# Patient Record
Sex: MALE | Race: WHITE | NOT HISPANIC OR LATINO | Employment: FULL TIME | URBAN - NONMETROPOLITAN AREA
[De-identification: names, ages, dates, MRNs, and addresses within clinical notes are randomized per-mention and may not be internally consistent; named-entity substitution may affect disease eponyms.]

---

## 2017-09-12 ENCOUNTER — OFFICE VISIT (OUTPATIENT)
Dept: UROLOGY | Facility: CLINIC | Age: 67
End: 2017-09-12

## 2017-09-12 VITALS — WEIGHT: 166 LBS | TEMPERATURE: 97.2 F | HEIGHT: 66 IN | BODY MASS INDEX: 26.68 KG/M2

## 2017-09-12 DIAGNOSIS — N40.1 BENIGN NON-NODULAR PROSTATIC HYPERPLASIA WITH LOWER URINARY TRACT SYMPTOMS: ICD-10-CM

## 2017-09-12 LAB
BILIRUB BLD-MCNC: NEGATIVE MG/DL
CLARITY, POC: CLEAR
COLOR UR: YELLOW
GLUCOSE UR STRIP-MCNC: NEGATIVE MG/DL
KETONES UR QL: NEGATIVE
LEUKOCYTE EST, POC: NEGATIVE
NITRITE UR-MCNC: NEGATIVE MG/ML
PH UR: 6 [PH] (ref 5–8)
PROT UR STRIP-MCNC: NEGATIVE MG/DL
PSA SERPL-MCNC: 1.53 NG/ML (ref 0–4)
RBC # UR STRIP: NEGATIVE /UL
SP GR UR: 1.01 (ref 1–1.03)
UROBILINOGEN UR QL: NORMAL

## 2017-09-12 PROCEDURE — 99204 OFFICE O/P NEW MOD 45 MIN: CPT | Performed by: UROLOGY

## 2017-09-12 PROCEDURE — 81003 URINALYSIS AUTO W/O SCOPE: CPT | Performed by: UROLOGY

## 2017-09-12 PROCEDURE — 51798 US URINE CAPACITY MEASURE: CPT | Performed by: UROLOGY

## 2017-09-12 RX ORDER — PANTOPRAZOLE SODIUM 40 MG/1
TABLET, DELAYED RELEASE ORAL
Refills: 5 | COMMUNITY
Start: 2017-09-06

## 2017-09-12 RX ORDER — TAMSULOSIN HYDROCHLORIDE 0.4 MG/1
1 CAPSULE ORAL DAILY
Qty: 30 CAPSULE | Refills: 11 | Status: SHIPPED | OUTPATIENT
Start: 2017-09-12 | End: 2018-08-21 | Stop reason: SDUPTHER

## 2017-09-12 NOTE — PROGRESS NOTES
Mr. Rosario is 66 y.o. male    Chief Complaint   Patient presents with   • Benign Prostatic Hypertrophy       History of Present Illness    The following portions of the patient's history were reviewed and updated as appropriate: allergies, current medications, past family history, past medical history, past social history, past surgical history and problem list.    Review of Systems   Constitutional: Negative for appetite change and fever.   HENT: Negative for hearing loss and sore throat.    Eyes: Negative for pain and redness.   Respiratory: Negative for cough and shortness of breath.    Cardiovascular: Negative for chest pain and leg swelling.   Gastrointestinal: Negative for anal bleeding, nausea and vomiting.   Endocrine: Negative for cold intolerance and heat intolerance.   Genitourinary: Positive for frequency. Negative for dysuria, flank pain, hematuria and urgency.   Musculoskeletal: Negative for joint swelling and myalgias.   Skin: Negative for color change and rash.   Allergic/Immunologic: Negative for immunocompromised state.   Neurological: Negative for dizziness and speech difficulty.   Hematological: Negative for adenopathy. Does not bruise/bleed easily.   Psychiatric/Behavioral: Negative for dysphoric mood and suicidal ideas.         Current Outpatient Prescriptions:   •  pantoprazole (PROTONIX) 40 MG EC tablet, TAKE 1 TABLET BY MOUTH EVERY DAY, Disp: , Rfl: 5  •  tamsulosin (FLOMAX) 0.4 MG capsule 24 hr capsule, Take 1 capsule by mouth Daily., Disp: 30 capsule, Rfl: 11    History reviewed. No pertinent past medical history.    Past Surgical History:   Procedure Laterality Date   • HERNIA REPAIR     • TONSILLECTOMY         Social History     Social History   • Marital status: Unknown     Spouse name: N/A   • Number of children: N/A   • Years of education: N/A     Social History Main Topics   • Smoking status: Never Smoker   • Smokeless tobacco: None   • Alcohol use None   • Drug use: None   •  "Sexual activity: Not Asked     Other Topics Concern   • None     Social History Narrative   • None       Family History   Problem Relation Age of Onset   • No Known Problems Father    • No Known Problems Mother        Objective    Temp 97.2 °F (36.2 °C)  Ht 66\" (167.6 cm)  Wt 166 lb (75.3 kg)  BMI 26.79 kg/m2    Physical Exam  Constitutional: Well nourished, Well developed; No apparent distress  Psychiatric: Appropriate affect; Alert and oriented  Eyes: Unremarkable  Musculoskeletal: Normal gait and station  GI: Abdomen is soft, non-tender  Respiratory: No distress; Unlabored movement; No accessory musculature needed with symmetric movements  Skin: No pallor or diaphoresis  ; Penis and testicles are normal; Prostate 40-50 mL without nodule        No results found for any previous visit.    Results for orders placed or performed in visit on 09/12/17   PSA   Result Value Ref Range    PSA 1.530 0.000 - 4.000 ng/mL   POC Urinalysis Dipstick, Automated   Result Value Ref Range    Color Yellow Yellow, Straw, Dark Yellow, Sharon    Clarity, UA Clear Clear    Glucose, UA Negative Negative, 1000 mg/dL (3+) mg/dL    Bilirubin Negative Negative    Ketones, UA Negative Negative    Specific Gravity  1.015 1.005 - 1.030    Blood, UA Negative Negative    pH, Urine 6.0 5.0 - 8.0    Protein, POC Negative Negative mg/dL    Urobilinogen, UA Normal Normal    Leukocytes Negative Negative    Nitrite, UA Negative Negative        Bladder Scan interpretation  Estimation of residual urine via abdominal ultrasound  Residual Urine: 0 ml  Indication: frequency  Position: Supine  Examination: Incremental scanning of the suprapubic area using 3 MHz transducer using copious amounts of acoustic gel.   Findings: An anechoic area was demonstrated which represented the bladder, with measurement of residual urine as noted. I inspected this myself. In that the residual urine was stable or insignificant, no treatment will be necessary at this time. "       Assessment and Plan    Hua was seen today for benign prostatic hypertrophy.    Diagnoses and all orders for this visit:    Benign non-nodular prostatic hyperplasia with lower urinary tract symptoms  -     POC Urinalysis Dipstick, Automated  -     PSA  -     tamsulosin (FLOMAX) 0.4 MG capsule 24 hr capsule; Take 1 capsule by mouth Daily.  I reviewed the records from his primary care physician.  This is a 66-year-old gentleman with a several month history of lower urinary tract symptoms.  He has never been tried on medications.  I do not have a history of a PSA.    Discussed with Mr. Beck the most likely cause of lower urinary tract symptoms in men.  He does empty his bladder well.  I do think this is likely due to an enlarged prostate given his age and exam.  Most appropriate next step as his trial of medication and he would like to proceed.  I will get a PSA today.  He understands the side effects of Flomax and I will see him back in a few months to assess efficacy of treatment    He and I discussed BPH today including the pathophysiology, diagnosis, and management. The role of PSA in management of PSA is discussed.  The patient understands the purpose of a uroflow, post void residual and the need for cystoscopy. We discussed the role of Alpha blockers, 5-Alpha redcuctase inhibitors, and anticholinergics. Minimally invasive techniques including TUNA, TUMT, Interstitial laser, and WIT are considered. TUIP, TURP, & Laser ablation are also explained as more invasive techniques. TURP is acknowledged to be the gold standard for surgical management. Behavioral, dietary, and herbal therapy are also discussed pointing out the limited available data for the latter. Based upon his symptomatology, we have elected to begin a trial of alpha blockade. The risks of orthostasis, central mediated dizziness, ejaculatory dysfunction, reflux, & rhinitis are discussed with the patient.

## 2017-10-24 ENCOUNTER — OFFICE VISIT (OUTPATIENT)
Dept: UROLOGY | Facility: CLINIC | Age: 67
End: 2017-10-24

## 2017-10-24 VITALS
DIASTOLIC BLOOD PRESSURE: 68 MMHG | HEIGHT: 66 IN | BODY MASS INDEX: 27.19 KG/M2 | WEIGHT: 169.2 LBS | TEMPERATURE: 98.8 F | SYSTOLIC BLOOD PRESSURE: 124 MMHG

## 2017-10-24 DIAGNOSIS — N40.1 BENIGN PROSTATIC HYPERPLASIA WITH NOCTURIA: Primary | ICD-10-CM

## 2017-10-24 DIAGNOSIS — R35.1 BENIGN PROSTATIC HYPERPLASIA WITH NOCTURIA: Primary | ICD-10-CM

## 2017-10-24 PROCEDURE — 99213 OFFICE O/P EST LOW 20 MIN: CPT | Performed by: UROLOGY

## 2017-10-24 NOTE — PROGRESS NOTES
Mr. Rosario is 67 y.o. male    Chief Complaint   Patient presents with   • Benign Prostatic Hypertrophy       Benign Prostatic Hypertrophy   This is a chronic problem. The current episode started more than 1 year ago. The problem has been rapidly improving since onset. Irritative symptoms include frequency and nocturia. Irritative symptoms do not include urgency. Pertinent negatives include no chills or hematuria. AUA score is 8-19. Nothing aggravates the symptoms. Past treatments include tamsulosin. The treatment provided significant relief. He has been using treatment for 1 to 6 months.       The following portions of the patient's history were reviewed and updated as appropriate: allergies, current medications, past family history, past medical history, past social history, past surgical history and problem list.    Review of Systems   Constitutional: Negative for chills and fever.   Gastrointestinal: Negative for abdominal pain, anal bleeding and blood in stool.   Genitourinary: Positive for frequency and nocturia. Negative for difficulty urinating, hematuria and urgency.         Current Outpatient Prescriptions:   •  Multiple Vitamins-Minerals (MULTIVITAMIN ADULT PO), Take  by mouth., Disp: , Rfl:   •  pantoprazole (PROTONIX) 40 MG EC tablet, TAKE 1 TABLET BY MOUTH EVERY DAY, Disp: , Rfl: 5  •  tamsulosin (FLOMAX) 0.4 MG capsule 24 hr capsule, Take 1 capsule by mouth Daily., Disp: 30 capsule, Rfl: 11    History reviewed. No pertinent past medical history.    Past Surgical History:   Procedure Laterality Date   • HERNIA REPAIR     • TONSILLECTOMY         Social History     Social History   • Marital status: Unknown     Spouse name: N/A   • Number of children: N/A   • Years of education: N/A     Social History Main Topics   • Smoking status: Never Smoker   • Smokeless tobacco: None   • Alcohol use No   • Drug use: None   • Sexual activity: Not Asked     Other Topics Concern   • None     Social History Narrative  "      Family History   Problem Relation Age of Onset   • No Known Problems Father    • No Known Problems Mother        Objective    /68  Temp 98.8 °F (37.1 °C)  Ht 66\" (167.6 cm)  Wt 169 lb 3.2 oz (76.7 kg)  BMI 27.31 kg/m2    Physical Exam    Office Visit on 09/12/2017   Component Date Value Ref Range Status   • Color 09/12/2017 Yellow  Yellow, Straw, Dark Yellow, Sharon Final   • Clarity, UA 09/12/2017 Clear  Clear Final   • Glucose, UA 09/12/2017 Negative  Negative, 1000 mg/dL (3+) mg/dL Final   • Bilirubin 09/12/2017 Negative  Negative Final   • Ketones, UA 09/12/2017 Negative  Negative Final   • Specific Gravity  09/12/2017 1.015  1.005 - 1.030 Final   • Blood, UA 09/12/2017 Negative  Negative Final   • pH, Urine 09/12/2017 6.0  5.0 - 8.0 Final   • Protein, POC 09/12/2017 Negative  Negative mg/dL Final   • Urobilinogen, UA 09/12/2017 Normal  Normal Final   • Leukocytes 09/12/2017 Negative  Negative Final   • Nitrite, UA 09/12/2017 Negative  Negative Final   • PSA 09/12/2017 1.530  0.000 - 4.000 ng/mL Final       Results for orders placed or performed in visit on 09/12/17   PSA   Result Value Ref Range    PSA 1.530 0.000 - 4.000 ng/mL   POC Urinalysis Dipstick, Automated   Result Value Ref Range    Color Yellow Yellow, Straw, Dark Yellow, Sharon    Clarity, UA Clear Clear    Glucose, UA Negative Negative, 1000 mg/dL (3+) mg/dL    Bilirubin Negative Negative    Ketones, UA Negative Negative    Specific Gravity  1.015 1.005 - 1.030    Blood, UA Negative Negative    pH, Urine 6.0 5.0 - 8.0    Protein, POC Negative Negative mg/dL    Urobilinogen, UA Normal Normal    Leukocytes Negative Negative    Nitrite, UA Negative Negative     Assessment and Plan    Hua was seen today for benign prostatic hypertrophy.    Diagnoses and all orders for this visit:    Benign prostatic hyperplasia with nocturia  Doing well on the Flomax.  Symptoms significantly improved.  He is overall very happy with this.  He states he " noticed the most improvement with the nighttime urination.  He would like to continue on this medication, and states he has no side effects this time.  I will see him back yearly with a symptom check.  PSA this visit was 1.5, and he should continue with yearly PSA screening with his primary care physician.

## 2018-08-21 DIAGNOSIS — N40.1 BENIGN NON-NODULAR PROSTATIC HYPERPLASIA WITH LOWER URINARY TRACT SYMPTOMS: ICD-10-CM

## 2018-08-21 RX ORDER — TAMSULOSIN HYDROCHLORIDE 0.4 MG/1
1 CAPSULE ORAL DAILY
Qty: 30 CAPSULE | Refills: 2 | Status: SHIPPED | OUTPATIENT
Start: 2018-08-21

## 2019-05-02 ENCOUNTER — TELEPHONE (OUTPATIENT)
Dept: NEUROLOGY | Age: 69
End: 2019-05-02

## 2019-05-02 NOTE — TELEPHONE ENCOUNTER
Called spoke with patient about an appointment with Dr Rivera Gonzalez   It is now 6-6-2015 @10:30 a m

## 2019-05-29 ENCOUNTER — TELEPHONE (OUTPATIENT)
Dept: NEUROSURGERY | Age: 69
End: 2019-05-29

## 2019-05-29 NOTE — TELEPHONE ENCOUNTER
Provider schedule changed, spoke with pt and gave new time and date, pt is upset that appointment had to be r/s a second time.  05-29-19 cp

## 2020-03-05 ENCOUNTER — HOSPITAL ENCOUNTER (OUTPATIENT)
Dept: NUCLEAR MEDICINE | Age: 70
Discharge: HOME | End: 2020-03-07
Payer: MEDICARE

## 2020-03-05 PROCEDURE — 78227 HEPATOBIL SYST IMAGE W/DRUG: CPT

## 2020-03-05 PROCEDURE — 3430000000 HC RX DIAGNOSTIC RADIOPHARMACEUTICAL: Performed by: NURSE PRACTITIONER

## 2020-03-05 PROCEDURE — A9537 TC99M MEBROFENIN: HCPCS | Performed by: NURSE PRACTITIONER

## 2020-03-05 RX ADMIN — Medication 5 MILLICURIE: at 09:45
